# Patient Record
Sex: MALE | Race: NATIVE HAWAIIAN OR OTHER PACIFIC ISLANDER | NOT HISPANIC OR LATINO | URBAN - METROPOLITAN AREA
[De-identification: names, ages, dates, MRNs, and addresses within clinical notes are randomized per-mention and may not be internally consistent; named-entity substitution may affect disease eponyms.]

---

## 2023-09-19 ENCOUNTER — EMERGENCY (EMERGENCY)
Facility: HOSPITAL | Age: 33
LOS: 1 days | Discharge: HOME | End: 2023-09-19
Attending: EMERGENCY MEDICINE
Payer: COMMERCIAL

## 2023-09-19 VITALS
TEMPERATURE: 98 F | WEIGHT: 192.9 LBS | RESPIRATION RATE: 18 BRPM | HEART RATE: 65 BPM | SYSTOLIC BLOOD PRESSURE: 122 MMHG | DIASTOLIC BLOOD PRESSURE: 88 MMHG | HEIGHT: 66 IN | OXYGEN SATURATION: 99 %

## 2023-09-19 PROCEDURE — 99284 EMERGENCY DEPT VISIT MOD MDM: CPT | Mod: 25

## 2023-09-19 PROCEDURE — 29125 APPL SHORT ARM SPLINT STATIC: CPT | Mod: LT

## 2023-09-19 PROCEDURE — 73090 X-RAY EXAM OF FOREARM: CPT

## 2023-09-19 PROCEDURE — 73130 X-RAY EXAM OF HAND: CPT

## 2023-09-19 PROCEDURE — 73110 X-RAY EXAM OF WRIST: CPT | Mod: 26,LT

## 2023-09-19 PROCEDURE — 73110 X-RAY EXAM OF WRIST: CPT

## 2023-09-19 RX ORDER — ACETAMINOPHEN 500 MG
975 TABLET ORAL ONCE
Refills: 0 | Status: COMPLETED | OUTPATIENT
Start: 2023-09-19 | End: 2023-09-19

## 2023-09-19 RX ADMIN — Medication 975 MILLIGRAM(S): at 22:28

## 2023-09-19 NOTE — ED PROVIDER NOTE - NSFOLLOWUPINSTRUCTIONS_ED_ALL_ED_FT
Thank you for visiting our Emergency Department, it has been a pleasure taking part in your healthcare. Please follow up with your primary doctor within x48 hours.    Your discharge diagnosis is:  LEFT  distal Radius fracture    Return precautions to the Emergency Department include but are not limited to: unrelenting nausea, vomiting, fever, chills, chest pain, shortness of breath, dizziness, abdominal pain, worsening pain, syncope, blood in urine or stool, headache that doesn't resolve, numbness or tingling, loss of sensation, loss of motor function, or any other concerning symptoms.     Follow up with Dr. Beasley -orthopedist in the next week    Take oxycodone  for pain as needed 5 mg every 6 hrs- do not drive while taking this medication     -- Please use 650mg Tylenol (also called acetaminophen) every 4 hours & 600mg Motrin (also called Advil or ibuprofen) every 6 hours as needed for pain/discomfort/swelling. You can get these without a prescription. Don't use more than 3500mg of Tylenol in any 24-hour period. Make sure your other prescription/over-the-counter medications don't contain any Tylenol so you don't take too much. If you have any stomach discomfort while taking Motrin, you can use TUMS or Pepcid or Zantac (these can all be bought without a prescription).     Rest, apply ice over covered skin for no more than 15 minutes at a time, keep affected extremity elevated, use compressive dressing or splint as provided and instructed.  do not get splint wet or remove it,

## 2023-09-19 NOTE — ED PROVIDER NOTE - ATTENDING CONTRIBUTION TO CARE
31 yo male p/w L wrist pain after extending his LUE into a wall earlier today.  swelling noted on exam.  x-ray with intact ulna but distal radius fracture.  + distal pulses.  see splint note.  ortho follow-up as outpatient.

## 2023-09-19 NOTE — ED PROVIDER NOTE - PATIENT PORTAL LINK FT
You can access the FollowMyHealth Patient Portal offered by [facility] by registering at the following website: [email or web address]. By joining [facility]’s FollowMyHealth portal, you will also be able to view your health information using other applications (apps) compatible with our system.

## 2023-09-19 NOTE — ED PROVIDER NOTE - OBJECTIVE STATEMENT
33 yo male in blue 34 R presents to the ER for evaluation of left wrist injury.  Pt awake alert oriented x3 states "I was playing handball today when I accidentally ran into the wall with my left hand flexed back and I felt a pop". Pt with obvious deformity, skin intact, radial pulses present.   Pt right hand dominant .

## 2023-09-19 NOTE — ED PROCEDURE NOTE - NS ED PROCEDURE ASSISTED BY
Telephone Encounter by Rocio Landon MD at 10/13/17 09:34 AM     Author:  Rocio Landon MD Service:  (none) Author Type:  Physician     Filed:  10/13/17 09:34 AM Encounter Date:  10/13/2017 Status:  Signed     :  Rocio Landon MD (Physician)            Yes[HW1.1M]  Electronically Signed by:    Rocio Landon MD , 10/13/2017[HW1.1T]        Revision History        User Key Date/Time User Provider Type Action    > HW1.1 10/13/17 09:34 AM Rocio Landon MD Physician Sign    M - Manual, T - Template             The procedure was performed independently

## 2023-09-19 NOTE — ED PROVIDER NOTE - NS ED ATTENDING STATEMENT MOD
I have seen and examined this patient and fully participated in the care of this patient as the teaching attending.  The service was shared with the BRISSA.  I reviewed and verified the documentation and independently performed the documented:

## 2023-09-20 PROCEDURE — 73090 X-RAY EXAM OF FOREARM: CPT | Mod: 26,LT

## 2023-09-20 PROCEDURE — 73130 X-RAY EXAM OF HAND: CPT | Mod: 26,LT

## 2023-09-20 RX ORDER — OXYCODONE HYDROCHLORIDE 5 MG/1
1 TABLET ORAL
Qty: 12 | Refills: 0
Start: 2023-09-20 | End: 2023-09-22

## 2023-09-20 NOTE — ED ADULT NURSE NOTE - OBJECTIVE STATEMENT
32yM, presents to the ED c/o Left wrist pain. Pt states he was playing handball today when he accidently ran into the wall and felt a "pop." Obvious deformity noted, skin intact, radial pulses present.